# Patient Record
Sex: MALE | Race: BLACK OR AFRICAN AMERICAN | NOT HISPANIC OR LATINO | Employment: FULL TIME | URBAN - METROPOLITAN AREA
[De-identification: names, ages, dates, MRNs, and addresses within clinical notes are randomized per-mention and may not be internally consistent; named-entity substitution may affect disease eponyms.]

---

## 2024-09-10 ENCOUNTER — OFFICE VISIT (OUTPATIENT)
Dept: URGENT CARE | Age: 49
End: 2024-09-10
Payer: COMMERCIAL

## 2024-09-10 VITALS
HEART RATE: 90 BPM | RESPIRATION RATE: 16 BRPM | DIASTOLIC BLOOD PRESSURE: 87 MMHG | OXYGEN SATURATION: 97 % | BODY MASS INDEX: 29.38 KG/M2 | WEIGHT: 182 LBS | TEMPERATURE: 99.1 F | SYSTOLIC BLOOD PRESSURE: 137 MMHG

## 2024-09-10 DIAGNOSIS — M75.52 ACUTE SHOULDER BURSITIS, LEFT: Primary | ICD-10-CM

## 2024-09-10 RX ORDER — METHOCARBAMOL 500 MG/1
500 TABLET, FILM COATED ORAL 4 TIMES DAILY
Qty: 40 TABLET | Refills: 0 | Status: SHIPPED | OUTPATIENT
Start: 2024-09-10 | End: 2024-09-20

## 2024-09-10 RX ORDER — METHYLPREDNISOLONE 4 MG/1
TABLET ORAL
Qty: 21 TABLET | Refills: 0 | Status: SHIPPED | OUTPATIENT
Start: 2024-09-10

## 2024-09-10 NOTE — PROGRESS NOTES
Subjective   Patient ID: Compa Godwin is a 48 y.o. male. They present today with a chief complaint of Shoulder Pain (Left shoulder pain times 2 weeks. Patient denies any new injury.).    History of Present Illness  49 yo male coming in for left shoulder pain. He states he thinks he just overworked the shoulder. He denies any real injury to the shoulder. He denies any numbness or tingling in the distal extremity.    Past Medical History  Allergies as of 09/10/2024    (No Known Allergies)       (Not in a hospital admission)       No past medical history on file.    No past surgical history on file.     reports that he has been smoking cigarettes. He does not have any smokeless tobacco history on file.    Review of Systems  Review of Systems:  General: No weight loss, fatigue, anorexia, insomnia, fever, chills.  Cardiac: No chest pain, palpitations, syncope, near syncope.  Pulmonary:  No shortness of breath, cough, hemoptysis  Heme/lymph: No swollen glands, fever, bleeding  Musculoskeletal: No limb pain, joint pain, joint swelling. Positive left shoulder pain with movement  Skin: No rashes  Neuro: No numbness, tingling, headaches                                 Objective    Vitals:    09/10/24 1727   BP: 137/87   Pulse: 90   Resp: 16   Temp: 37.3 °C (99.1 °F)   SpO2: 97%   Weight: 82.6 kg (182 lb)     No LMP for male patient.    Physical Exam  Shoulder Injury:  Appearance: Alert, oriented, cooperative, in no acute distress. Well-nourished and well hydrated.   Skin: Intact, dry skin, no lesions, rash, petechiae or purpura  Pulmonary: Clear bilaterally with good chest wall excursion. No rales, rhonchi or wheezing. No accessory muscle use or stridor.   Cardiac: Normal S1, S2 without murmur, rub, gallop, or extrasystole. No JVD.  Musculoskeletal: No peripheral edema. Exam of the left shoulder shows no deformity, edema, or erythema. There is mild pain on palpation to the glenohumeral joint. There is decreased active range  of motion. Passive range of motion is slightly improved over active range of motion. Patient has strong equal hand grasps bilaterally, is neurovascularly intact. Skin is intact.      Procedures    Point of Care Test & Imaging Results from this visit  No results found for this or any previous visit.  No results found.    Diagnostic study results (if any) were reviewed by SELINA Stevenson.    Assessment/Plan   Allergies, medications, history, and pertinent labs/EKGs/Imaging reviewed by SELINA Stevenson.     Medical Decision Making  Treatment: Medrol dose pack and robaxin prescribed  Differential: 1) shoulder strain, 2) bursitis of shoulder, 3) dislocation of shoulder  Plan: Discussed differential with the pateint. Patient will follow up with the PCP in the next 2-3 days. Return for any worsening symptoms or go to the ER for further evaluation. Patient understands return precautions and discharege insturctions.  Impression:   1) left shoulder bursitis      Orders and Diagnoses  Diagnoses and all orders for this visit:  Acute shoulder bursitis, left  -     methylPREDNISolone (Medrol Dospak) 4 mg tablets; Follow schedule on package instructions  -     methocarbamol (Robaxin) 500 mg tablet; Take 1 tablet (500 mg) by mouth 4 times a day for 10 days.      Medical Admin Record      Follow Up Instructions  No follow-ups on file.    Patient disposition: Home    Electronically signed by SELINA Stevenson  5:37 PM